# Patient Record
(demographics unavailable — no encounter records)

---

## 2024-10-17 NOTE — HISTORY OF PRESENT ILLNESS
[Disease: _____________________] : Disease: [unfilled] [AJCC Stage: ____] : AJCC Stage: [unfilled] [de-identified] : IIIC1 uterine carcinosarcoma\par  5 left SLN with ITCs. \par  RTLH, BSO, SLNM, staging 3/14/19\par  \par  Adjuvant therapy:\par  Carboplatin and paclitaxel 6 cycles 4/24/19 - 9/5/19\par  EBRT 5040 cGy in 28 fractions \par  VBRT 1200 cGy in 2 fractions. \par  RT completed 11/26/19. \par   [de-identified] : Patient here for a follow up visit, has recent labs done by PCP in 3/2024. Mammogram 4/30/2024: BI-RAD 1; colonoscopy performed last year 2023; next in 10 years. Overall reports to be feeling well. No new medications started since last visit.  Denies any chest pain, abdominal pain, vaginal bleeding, sob, diarrhea, fever, chills.

## 2024-10-17 NOTE — HISTORY OF PRESENT ILLNESS
[Disease: _____________________] : Disease: [unfilled] [AJCC Stage: ____] : AJCC Stage: [unfilled] [de-identified] : IIIC1 uterine carcinosarcoma\par  5 left SLN with ITCs. \par  RTLH, BSO, SLNM, staging 3/14/19\par  \par  Adjuvant therapy:\par  Carboplatin and paclitaxel 6 cycles 4/24/19 - 9/5/19\par  EBRT 5040 cGy in 28 fractions \par  VBRT 1200 cGy in 2 fractions. \par  RT completed 11/26/19. \par   [de-identified] : Patient here for a follow up visit, has recent labs done by PCP in 3/2024. Mammogram 4/30/2024: BI-RAD 1; colonoscopy performed last year 2023; next in 10 years. Overall reports to be feeling well. No new medications started since last visit.  Denies any chest pain, abdominal pain, vaginal bleeding, sob, diarrhea, fever, chills.

## 2024-10-17 NOTE — HISTORY OF PRESENT ILLNESS
[Disease: _____________________] : Disease: [unfilled] [AJCC Stage: ____] : AJCC Stage: [unfilled] [de-identified] : IIIC1 uterine carcinosarcoma\par  5 left SLN with ITCs. \par  RTLH, BSO, SLNM, staging 3/14/19\par  \par  Adjuvant therapy:\par  Carboplatin and paclitaxel 6 cycles 4/24/19 - 9/5/19\par  EBRT 5040 cGy in 28 fractions \par  VBRT 1200 cGy in 2 fractions. \par  RT completed 11/26/19. \par   [de-identified] : Patient here for a follow up visit, has recent labs done by PCP in 3/2024. Mammogram 4/30/2024: BI-RAD 1; colonoscopy performed last year 2023; next in 10 years. Overall reports to be feeling well. No new medications started since last visit.  Denies any chest pain, abdominal pain, vaginal bleeding, sob, diarrhea, fever, chills.

## 2024-10-17 NOTE — HISTORY OF PRESENT ILLNESS
[Disease: _____________________] : Disease: [unfilled] [AJCC Stage: ____] : AJCC Stage: [unfilled] [de-identified] : IIIC1 uterine carcinosarcoma\par  5 left SLN with ITCs. \par  RTLH, BSO, SLNM, staging 3/14/19\par  \par  Adjuvant therapy:\par  Carboplatin and paclitaxel 6 cycles 4/24/19 - 9/5/19\par  EBRT 5040 cGy in 28 fractions \par  VBRT 1200 cGy in 2 fractions. \par  RT completed 11/26/19. \par   [de-identified] : Patient here for a follow up visit, has recent labs done by PCP in 3/2024. Mammogram 4/30/2024: BI-RAD 1; colonoscopy performed last year 2023; next in 10 years. Overall reports to be feeling well. No new medications started since last visit.  Denies any chest pain, abdominal pain, vaginal bleeding, sob, diarrhea, fever, chills.

## 2024-11-26 NOTE — PHYSICAL EXAM
[Chaperone Present] : A chaperone was present in the examining room during all aspects of the physical examination [Absent] : Adnexa(ae): Absent [Normal] : Recto-Vaginal Exam: Normal [Fully active, able to carry on all pre-disease performance without restriction] : Status 0 - Fully active, able to carry on all pre-disease performance without restriction [60057] : A chaperone was present during the pelvic exam. [FreeTextEntry2] : Lakesha [de-identified] : well healed LSC scars

## 2024-11-26 NOTE — HISTORY OF PRESENT ILLNESS
[FreeTextEntry1] : IIIC1 uterine carcinosarcoma 5 left SLN with ITCs.  RTLH, BSO, SLNM, staging 3/14/19  Last seen 9/27/22  Adjuvant therapy: Carboplatin and paclitaxel  6 cycles 4/24/19 - 9/5/19 EBRT 5040 cGy in 28 fractions  VBRT 1200 cGy in 2 fractions.  RT completed 11/26/19.   Today presents for a surveillance visit. She was seen by Dr. Sue 1/2022.  She was seen by Dr. Martin 10/2024  Last imaging 8/2023 was [facility]. CXR normal.  remains wnl.

## 2024-11-26 NOTE — PLAN
[TextEntry] : Doing well.  Remains [facility]IE.  Has completed 5 years of oncologic surveillance.  Return in one year to see NP.

## 2025-01-09 NOTE — REVIEW OF SYSTEMS
[Constipation: Grade 0] : Constipation: Grade 0 [Diarrhea: Grade 0] : Diarrhea: Grade 0 [Fatigue: Grade 0] : Fatigue: Grade 0 [Hematuria: Grade 0] : Hematuria: Grade 0 [Urinary Incontinence: Grade 1 - Occasional (e.g., with coughing, sneezing, etc.), pads not indicated] : Urinary Incontinence: Grade 1 - Occasional (e.g., with coughing, sneezing, etc.), pads not indicated [Urinary Retention: Grade 0] : Urinary Retention: Grade 0 [Urinary Tract Pain: Grade 0] : Urinary Tract Pain: Grade 0 [Urinary Urgency: Grade 0] : Urinary Urgency: Grade 0 [Urinary Frequency: Grade 0] : Urinary Frequency: Grade 0

## 2025-01-09 NOTE — HISTORY OF PRESENT ILLNESS
[FreeTextEntry1] : Ms. Ray is a 79-year-old female with Stage IIIC1 carcinosarcoma of the uterus s/p TLH, BSO, SLNM and comprehensive surgical staging with multiple ihc positive nodes (3/14/19). She underwent 6 cycles of chemotherapy from 4/24/19 - 9/5/19, followed by pelvic EBRT to a total dose of 5,040 cGy in 28 fractions and VBT to a total dose of 1,200 cGy in 2 fractions, completed 11/26/19. She continues to follow up with Dr. Pierce and Dr. Martin.   1/2022: She states she is feeling well, she has no pain or fatigue. She denies any vaginal discharge/bleeding. She has no changes in urinary or bowel habits.  Routine surveillance CT scan noted interval development of a large left paraortic LN. PET/CT noted enlarged left paraaortic retroperitoneal lymph node  suspicious for a partially necrotic lymph node metastasis. This was biopsied 11/14/22 and negative.  3/3/23: Today, she is feeling well. Denies any pain. Denies any fatigue. No urinary or bowel complaints. No vaginal bleeding or discharge. Had CT 2/25/23 with interval decrease in size of the left para-aortic lymph node now measuring 0.6 x 1.2 cm previously 1.9 cm x 2.3 cm). No evidence of new lymphadenopathy or new metastatic disease in CAP.  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 U/S breast: BIRADS 1  1/9/25: Presents today for routine follow-up. Continues following with Bowen Pierce and Mario. Feeling well, no abdominal or pelvic pain, no vaginal bleeding, no GI complaints, urinary leakage (wears pads) but no dysuria.

## 2025-01-09 NOTE — PHYSICAL EXAM
[] : no respiratory distress [Respiration, Rhythm And Depth] : normal respiratory rhythm and effort [Abdomen Soft] : soft [No Focal Deficits] : no focal deficits [Oriented To Time, Place, And Person] : oriented to person, place, and time [Affect] : the affect was normal [Nondistended] : nondistended [Normal External Genitalia] : normal external genitalia  [Normal Vaginal Cuff] : vaginal cuff without lesion or nodularity [Nail Clubbing] : no clubbing  or cyanosis of the fingernails [Normal] : normal skin color and pigmentation and no rash

## 2025-07-08 NOTE — REVIEW OF SYSTEMS
[Constipation: Grade 0] : Constipation: Grade 0 [Diarrhea: Grade 0] : Diarrhea: Grade 0 [Fatigue: Grade 0] : Fatigue: Grade 0 [Hematuria: Grade 0] : Hematuria: Grade 0 [Urinary Retention: Grade 0] : Urinary Retention: Grade 0 [Urinary Tract Pain: Grade 0] : Urinary Tract Pain: Grade 0 [Urinary Urgency: Grade 0] : Urinary Urgency: Grade 0 [Urinary Frequency: Grade 0] : Urinary Frequency: Grade 0 [Urinary Incontinence: Grade 0] : Urinary Incontinence: Grade 0

## 2025-07-09 NOTE — HISTORY OF PRESENT ILLNESS
[FreeTextEntry1] : Ms. Ray is a 79-year-old female with Stage IIIC1 carcinosarcoma of the uterus s/p TLH, BSO, SLNM and comprehensive surgical staging with multiple ihc positive nodes (3/14/19). She underwent 6 cycles of chemotherapy from 4/24/19 - 9/5/19, followed by pelvic EBRT to a total dose of 5,040 cGy in 28 fractions and VBT to a total dose of 1,200 cGy in 2 fractions, completed 11/26/19. She continues to follow up with Dr. Pierce and Dr. Martin.   1/2022: She states she is feeling well, she has no pain or fatigue. She denies any vaginal discharge/bleeding. She has no changes in urinary or bowel habits.  Routine surveillance CT scan noted interval development of a large left paraortic LN. PET/CT noted enlarged left paraaortic retroperitoneal lymph node  suspicious for a partially necrotic lymph node metastasis. This was biopsied 11/14/22 and negative.  3/3/23: Today, she is feeling well. Denies any pain. Denies any fatigue. No urinary or bowel complaints. No vaginal bleeding or discharge. Had CT 2/25/23 with interval decrease in size of the left para-aortic lymph node now measuring 0.6 x 1.2 cm previously 1.9 cm x 2.3 cm). No evidence of new lymphadenopathy or new metastatic disease in CAP.  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 U/S breast: BIRADS 1  1/9/25: Presents today for routine follow-up. Continues following with Bowen Pierce and Mario. Feeling well, no abdominal or pelvic pain, no vaginal bleeding, no GI complaints, urinary leakage (wears pads) but no dysuria.   3/18/2025 PET/CT  1. Left para-aortic lymph node, similar in size since the most recent CT chest but enlarged since the CT in 8/2023, with increased activity is suspicious for malignant process. 2. No evidence of suspicious FDG avid malignant disease in the surgical cuff or in the pelvis.  6/20/25: CT C/A/P:  Enlarged left para-aortic lymph node is without significant change from 2/2/2025. No new sites of disease within the chest, abdomen or pelvis.  7/8/25: Here for follow up today to further discuss most recent CT imaging and prior PET.. She reports swelling to bilateral hands/LE x 2 months after RSV shot. LE is dependent, worsens by night and improves after elevation. Swelling in her hands have affected her ADLs, and requires assistance. She reports decreased ROM. Went to PCP, was referred to PT but did not go. She is currently being worked up by cardiology.Last saw Dr. Pierce in Nov,2024 gyn and Dr. Martin in Oct,2024.. She denies any new urinary or bowel changes. No vaginal bleeding or discharge.   Her primary physician is Dr. Jai Griffin [phone]

## 2025-07-09 NOTE — PHYSICAL EXAM
[] : no respiratory distress [Respiration, Rhythm And Depth] : normal respiratory rhythm and effort [Abdomen Soft] : soft [Nondistended] : nondistended [Normal External Genitalia] : normal external genitalia  [Normal Vaginal Cuff] : vaginal cuff without lesion or nodularity [Nail Clubbing] : no clubbing  or cyanosis of the fingernails [Normal] : normal skin color and pigmentation and no rash [No Focal Deficits] : no focal deficits [Oriented To Time, Place, And Person] : oriented to person, place, and time [Affect] : the affect was normal [de-identified] : decrease ROM shoulders, pitting edema hands bilateral lower extemity edema to calfwith minimal or no pitting

## 2025-07-09 NOTE — PHYSICAL EXAM
[] : no respiratory distress [Respiration, Rhythm And Depth] : normal respiratory rhythm and effort [Abdomen Soft] : soft [Nondistended] : nondistended [Normal External Genitalia] : normal external genitalia  [Normal Vaginal Cuff] : vaginal cuff without lesion or nodularity [Nail Clubbing] : no clubbing  or cyanosis of the fingernails [Normal] : normal skin color and pigmentation and no rash [No Focal Deficits] : no focal deficits [Oriented To Time, Place, And Person] : oriented to person, place, and time [Affect] : the affect was normal [de-identified] : decrease ROM shoulders, pitting edema hands bilateral lower extemity edema to calfwith minimal or no pitting

## 2025-07-09 NOTE — VITALS
[90: Able to carry normal activity; minor signs or symptoms of disease.] : 90: Able to carry normal activity; minor signs or symptoms of disease.  [Maximal Pain Intensity: 3/10] : 3/10 [Least Pain Intensity: 1/10] : 1/10 [Pain Description/Quality: ___] : Pain description/quality: [unfilled] [Pain Duration: ___] : Pain duration: [unfilled] [Pain Location: ___] : Pain Location: [unfilled] [Pain Interferes with ADLs] : Pain interferes with activities of daily living. [NoTreatment Scheduled] : no treatment scheduled [70: Cares for self; unalbe to carry on normal activity or do active work.] : 70: Cares for self; unable to carry on normal activity or do active work.